# Patient Record
Sex: FEMALE | URBAN - METROPOLITAN AREA
[De-identification: names, ages, dates, MRNs, and addresses within clinical notes are randomized per-mention and may not be internally consistent; named-entity substitution may affect disease eponyms.]

---

## 2019-05-28 ENCOUNTER — EMERGENCY (EMERGENCY)
Facility: HOSPITAL | Age: 24
LOS: 1 days | Discharge: LEFT BEFORE DISCHARGE | End: 2019-05-28
Admitting: EMERGENCY MEDICINE

## 2019-05-28 VITALS
SYSTOLIC BLOOD PRESSURE: 120 MMHG | RESPIRATION RATE: 17 BRPM | TEMPERATURE: 98 F | OXYGEN SATURATION: 99 % | HEART RATE: 74 BPM | DIASTOLIC BLOOD PRESSURE: 77 MMHG

## 2019-05-28 NOTE — ED ADULT TRIAGE NOTE - CHIEF COMPLAINT QUOTE
pt ambulatory complaining of non radiating left sided 'crampy' chest pain which woke her up from sleep. Denies SOB/dizziness. Denies use of contraceptives. Reports one hour plane ride yesterday morning.

## 2019-06-01 DIAGNOSIS — R07.89 OTHER CHEST PAIN: ICD-10-CM
